# Patient Record
Sex: MALE | NOT HISPANIC OR LATINO | URBAN - METROPOLITAN AREA
[De-identification: names, ages, dates, MRNs, and addresses within clinical notes are randomized per-mention and may not be internally consistent; named-entity substitution may affect disease eponyms.]

---

## 2017-11-02 ENCOUNTER — APPOINTMENT (RX ONLY)
Dept: URBAN - METROPOLITAN AREA TELEMEDICINE 2 | Facility: TELEMEDICINE | Age: 32
Setting detail: DERMATOLOGY
End: 2017-11-02

## 2017-11-02 DIAGNOSIS — L90.5 SCAR CONDITIONS AND FIBROSIS OF SKIN: ICD-10-CM

## 2017-11-02 DIAGNOSIS — Z41.9 ENCOUNTER FOR PROCEDURE FOR PURPOSES OTHER THAN REMEDYING HEALTH STATE, UNSPECIFIED: ICD-10-CM

## 2017-11-02 PROBLEM — F32.9 MAJOR DEPRESSIVE DISORDER, SINGLE EPISODE, UNSPECIFIED: Status: ACTIVE | Noted: 2017-11-02

## 2017-11-02 PROBLEM — L20.84 INTRINSIC (ALLERGIC) ECZEMA: Status: ACTIVE | Noted: 2017-11-02

## 2017-11-02 PROBLEM — L85.3 XEROSIS CUTIS: Status: ACTIVE | Noted: 2017-11-02

## 2017-11-02 PROBLEM — L70.0 ACNE VULGARIS: Status: ACTIVE | Noted: 2017-11-02

## 2017-11-02 PROBLEM — I10 ESSENTIAL (PRIMARY) HYPERTENSION: Status: ACTIVE | Noted: 2017-11-02

## 2017-11-02 PROCEDURE — ? COUNSELING

## 2017-11-02 PROCEDURE — ? RECOMMENDATIONS

## 2017-11-02 PROCEDURE — ? EDUCATIONAL RESOURCES PROVIDED

## 2017-11-02 PROCEDURE — ? COSMETIC CONSULTATION: FILLERS

## 2017-11-02 PROCEDURE — ? MEDICAL CONSULTATION: DYNAMIC RHYTIDES

## 2017-11-02 PROCEDURE — ? COSMETIC CONSULTATION - MICRO-NEEDLING

## 2017-11-02 ASSESSMENT — LOCATION SIMPLE DESCRIPTION DERM
LOCATION SIMPLE: RIGHT CHEEK
LOCATION SIMPLE: LEFT CHEEK

## 2017-11-02 ASSESSMENT — LOCATION DETAILED DESCRIPTION DERM
LOCATION DETAILED: RIGHT CENTRAL MALAR CHEEK
LOCATION DETAILED: LEFT CENTRAL MALAR CHEEK

## 2017-11-02 ASSESSMENT — LOCATION ZONE DERM: LOCATION ZONE: FACE

## 2017-11-02 NOTE — PROCEDURE: RECOMMENDATIONS
Detail Level: Simple
Recommendation Preamble: The following recommendations were made during the visit: \\n\\nBotox, glabella — 20 units at $13 a unit $260
Recommendations (Free Text): Discussed with patient that by doing  glabella area it has higher risks, would refer to plastic surgeon for that area if patient desires
Recommendation Preamble: The following recommendations were made during the visit: 1 syringe of Radiesse $575, possibly 2 syringes
Recommendation Preamble: The following recommendations were made during the visit: Consult with [facility] in [facility] for microneedling vs pro fractional

## 2017-11-09 ENCOUNTER — APPOINTMENT (RX ONLY)
Dept: URBAN - METROPOLITAN AREA TELEMEDICINE 2 | Facility: TELEMEDICINE | Age: 32
Setting detail: DERMATOLOGY
End: 2017-11-09

## 2017-11-09 DIAGNOSIS — Z41.9 ENCOUNTER FOR PROCEDURE FOR PURPOSES OTHER THAN REMEDYING HEALTH STATE, UNSPECIFIED: ICD-10-CM

## 2017-11-09 PROCEDURE — ? COSMETIC CONSULTATION: FILLERS

## 2017-11-09 PROCEDURE — ? FILLERS

## 2017-11-09 PROCEDURE — ? OTHER (COSMETIC)

## 2017-11-09 NOTE — PROCEDURE: OTHER (COSMETIC)
Other (Free Text): Discussed with patient that [facility] does not do  glabella area do to high risks in that area, would need to see a plastic surgeon \\nRecommended 20 units Botox to glabella $260
Detail Level: Zone

## 2017-11-09 NOTE — PROCEDURE: FILLERS
Marionette Lines Filler Volume In Cc: 0
Lot #: [id number]
Post-Care Instructions: Patient instructed to apply ice to reduce swelling.
Expiration Date (Month Year): 7-
Expiration Date (Month Year): 03/02/2018
Use Map Statement For Sites (Optional): No
Marionette Lines Filler Volume In Cc: 0.5
Nasolabial Folds Filler Volume In Cc: 1
Filler: Radiesse+
Topical Anesthesia?: BLT cream (benzocaine 20%, lidocaine 6%, tetracaine 4%)
Lot #: [id number]
Consent: Written consent obtained. Risks include but not limited to bruising, beading, irregular texture, ulceration, infection, allergic reaction, scar formation, incomplete augmentation, temporary nature, procedural pain.
Cheeks Filler Volume In Cc: 1.5
Detail Level: Detailed
Reconstitution Date: 11/9/17
Map Statment: See Attach Map for Complete Details
Price (Use Numbers Only, No Special Characters Or $): 492